# Patient Record
Sex: FEMALE | Race: WHITE | NOT HISPANIC OR LATINO | Employment: UNEMPLOYED | URBAN - METROPOLITAN AREA
[De-identification: names, ages, dates, MRNs, and addresses within clinical notes are randomized per-mention and may not be internally consistent; named-entity substitution may affect disease eponyms.]

---

## 2022-01-22 PROCEDURE — U0004 COV-19 TEST NON-CDC HGH THRU: HCPCS | Performed by: EMERGENCY MEDICINE

## 2022-04-19 ENCOUNTER — PATIENT ROUNDING (BHMG ONLY) (OUTPATIENT)
Dept: FAMILY MEDICINE CLINIC | Facility: CLINIC | Age: 32
End: 2022-04-19

## 2022-04-19 ENCOUNTER — OFFICE VISIT (OUTPATIENT)
Dept: FAMILY MEDICINE CLINIC | Facility: CLINIC | Age: 32
End: 2022-04-19

## 2022-04-19 VITALS
HEIGHT: 67 IN | BODY MASS INDEX: 30.42 KG/M2 | WEIGHT: 193.8 LBS | SYSTOLIC BLOOD PRESSURE: 120 MMHG | HEART RATE: 76 BPM | DIASTOLIC BLOOD PRESSURE: 82 MMHG | RESPIRATION RATE: 18 BRPM | OXYGEN SATURATION: 98 %

## 2022-04-19 DIAGNOSIS — K62.5 RECTAL BLEEDING: Primary | ICD-10-CM

## 2022-04-19 PROCEDURE — 99204 OFFICE O/P NEW MOD 45 MIN: CPT | Performed by: FAMILY MEDICINE

## 2022-04-19 NOTE — PROGRESS NOTES
New Patient Office Visit      Patient Name: Olena Casanova  : 1990   MRN: [id number]     Chief Complaint:    Chief Complaint   Patient presents with   • Establish Care   • Rectal Bleeding       Subjective   Rectal Bleeding  This is a recurrent problem. Episode onset: 3 years. The problem occurs intermittently. The problem has been waxing and waning. Associated symptoms include abdominal pain. Pertinent negatives include no change in bowel habit, fatigue, fever or joint swelling. Nothing aggravates the symptoms. Treatments tried: dietary changes. The treatment provided no relief.     History of Present Illness: Olena Casanova is a 32 y.o. female who is here today to establish care.    Previous primary care: [place], Dr. Luther Rivas    Chronic health conditions:  Chronic migraines: Started as a child. No regular treatment.    Other physicians currently involved in patient's care:  Patient Care Team:  Nabeel العلي DO as PCP - General (Family Medicine)    Acute Concerns:  HPI       The patient reports that she has had blood in her stool for 3 years. She states that she went to her primary care physician in [place] and was told that it was probably hemorrhoids. She states that she has cut out a lot of different things and nothing seems to help. She states that there are times where it is really bad. She states that it is sometimes in the toilet and sometimes a lot. She states that sometimes there is clotting, but not every time. She states that it comes in spurts. She states that there is not a specific time that it will be there for a week or so and then it will go away for a while and then it will come back, which is why they thought it was hemorrhoids. She states that her grandfather passed away from colon cancer at the age of 65. She states that she does not feel like it is getting worse. She states that it did not get worse when she was pregnant. She states that it is about the same. She states  that the blood is usually bright red. She denies any diarrhea, constipation, or changes in her bowel habits. She states that she has a lot of indigestion in her upper abdomen. She states that she gets bloated easily. She states that she is not sure if it is worse when she has bleeding. She states that she did not pay attention to it.     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 She states that she gets migraines a couple of times a month that last for 1 to 2 days. She states that she has been treated for those. She states that they started when she was really young. She states that sometimes she takes Tylenol, but sometimes nothing works. She states that she had CT scans and all sorts of things, and they could never find why. She states that they have been putting her out for days since she was 10 years old. She states that she had a lot of joint stiffness when she was pregnant, but nothing since. She states that she tried to track with food, but there is nothing consistent. She states that they thought it was dairy for a while, but then it just did not pan out over a long period of time.  The patient states she has tried gluten free diets, dairy free and sugar free.     She states that she had blood work done approximately 1 month ago for a thyroid issue for postpartum thyroiditis. She states that her TSH was 0.1 and now she is hypothyroid. She states that she had other blood work done at her 6-week postpartum appointment. She states that her blood counts came back to normal after birth.    This patient is accompanied by their self who contributes to the history of their care.      This patient is accompanied by their self who contributes to the history of their care.    The following portions of the patient's history were reviewed and updated as appropriate: allergies, current medications, past family history, past medical history, past social history, past surgical history and problem  list.    Subjective      Review of Systems:   Review of Systems   Constitutional: Negative for fatigue and fever.   Gastrointestinal: Positive for abdominal pain and hematochezia. Negative for change in bowel habit.   Musculoskeletal: Negative for joint swelling.    - See HPI and new patient paperwork scanned into chart    Past Medical History:   Past Medical History:   Diagnosis Date   • Anemia 9/2021    Geatstional   • Headache     Every few weeks. Last 1-2 days.   • Hyperthyroidism 2/2022    Postpartum   • Hypothyroidism 3/2022    After a month of hyperthyroid meds   • Ovarian cyst    • Thyroid disease during pregnancy        Past Surgical History: History reviewed. No pertinent surgical history.    Family History:   Family History   Problem Relation Age of Onset   • Alcohol abuse Mother    • Cancer Maternal Grandfather         Colon cancer   • Diabetes Maternal Grandmother    • Heart disease Maternal Grandmother    • Kidney disease Maternal Grandmother    • Thyroid disease Sister    • Thyroid disease Paternal Aunt        Social History:   Social History     Socioeconomic History   • Marital status:    Tobacco Use   • Smoking status: Never Smoker   • Smokeless tobacco: Never Used   Vaping Use   • Vaping Use: Never used   Substance and Sexual Activity   • Alcohol use: Yes     Comment: Maybe a glass  once every few months   • Drug use: Never   • Sexual activity: Yes     Partners: Male     Birth control/protection: None       Tobacco History:   Social History     Tobacco Use   Smoking Status Never Smoker   Smokeless Tobacco Never Used       Medications:     Current Outpatient Medications:   •  PRENATAL W/O A VIT-FE FUM-FA PO, Take  by mouth., Disp: , Rfl:   •  FeroSul 325 (65 Fe) MG tablet, Take 1 tablet by mouth Daily With Breakfast., Disp: , Rfl:     Allergies:   No Known Allergies    Objective   Objective     Physical Exam:  Vital Signs:   Vitals:    04/19/22 1435   BP: 120/82   Pulse: 76   Resp: 18  "  SpO2: 98%   Weight: 87.9 kg (193 lb 12.8 oz)   Height: 170.2 cm (67.01\")     Body mass index is 30.35 kg/m².     Physical Exam  Nursing note reviewed  Const: NAD, A&Ox4, Pleasant, Cooperative  Eyes: EOMI, no conjunctivitis  ENT: No nasal discharge present, neck supple  Cardiac: Regular rate and rhythm, no cyanosis  Resp: Respiratory rate within normal limits, no increased work of breathing, no audible wheezing or retractions noted  GI: No distention or ascites  MSK: Motor and sensation grossly intact in bilateral upper extremities  Neurologic: CN II-XII grossly intact  Psych: Appropriate mood and behavior.  Skin: Warm, dry  Procedures/Radiology     Procedures  No radiology results for the last 7 days     Assessment/Plan   Assessment / Plan      Assessment/Plan:   Problems Addressed This Visit  Diagnoses and all orders for this visit:    1. Rectal bleeding (Primary)  -     Ambulatory Referral For Screening Colonoscopy      Problem List Items Addressed This Visit    None     Visit Diagnoses     Rectal bleeding    -  Primary    Relevant Orders    Ambulatory Referral For Screening Colonoscopy        1. Rectal bleeding, chronic, intermittent.  - She has had issues for 3 years at this point. I would recommend a colonoscopy, at least flexible sigmoidoscopy to assess, and further treatment plan pending those results.    2. Migraine.  - I will give her a sample of Nurtec ODT as needed for her migraines, to see if it is effective for her. I told her to call and I will send in a full prescription.    3. Postpartum thyroiditis.  - Continue care with  midwifery.    We will plan to obtain previous records both for chronic preventative care as well as those related to the current episode of care.  Any records that the patient brought with her today were reviewed personally by me during the visit today and will be scanned into the chart for posterity.    Discussed the nature of the disease including relevant anatomy & expected " clinical course, risks, complications, implications, management, safe and proper use of medications. Plan of care reviewed with patient at the conclusion of today's visit. Education was provided regarding diagnosis and management.  Patient verbalizes understanding of and agreement with management plan. Encouraged therapeutic lifestyle changes including low calorie diet with plenty of fruits and vegetables, daily exercise, medication compliance, and keeping scheduled follow up appointments with me and any other providers. Encouraged patient to have appointment for complete physical, fasting labs, appropriate screenings, and immunizations on an annual basis. Discussed extended office hours, shared call, and appropriate use of the ER. Discussed generally we do not prescribe chronic controlled substances from this office. Appropriate referrals will be made to pain management and psychiatry if needed. Stressed the importance and expectation of medical compliance with plan of care, medications, and follow up appointments.    Patient Instructions   A referral has been placed for you today.  You will be contacted by our referral coordinator or the specialist's office to schedule your appointment over the next 2 to 3 business days.  Please make sure to check your voicemail, and ensure that we have the correct phone number on file for you.  Sometimes, based on insurance requirements, referrals may take longer to schedule. However if you have not heard from anyone within 7 days, please call the office to check on your referral status.       Follow Up:   Return in about 1 year (around 4/19/2023) for Annual.        DO BENJIE MontielE [address]ENNETH [address]ACI[address]  [facility]  [address]  [facility] [address]  Fax [phone]  Phone [phone]      Transcribed from ambient dictation for Nabeel العلي DO by Sarah Simpson.  04/19/22   15:55 EDT    Patient verbalized consent  to the visit recording.  I have personally performed the services described in this document as transcribed by the above individual, and it is both accurate and complete.  Sarah Simpson  4/28/2022  15:56 EDT

## 2022-04-19 NOTE — PATIENT INSTRUCTIONS
A referral has been placed for you today.  You will be contacted by our referral coordinator or the specialist's office to schedule your appointment over the next 2 to 3 business days.  Please make sure to check your voicemail, and ensure that we have the correct phone number on file for you.  Sometimes, based on insurance requirements, referrals may take longer to schedule. However if you have not heard from anyone within 7 days, please call the office to check on your referral status.

## 2022-05-05 RX ORDER — SODIUM, POTASSIUM,MAG SULFATES 17.5-3.13G
SOLUTION, RECONSTITUTED, ORAL ORAL
Qty: 354 ML | Refills: 0 | Status: SHIPPED | OUTPATIENT
Start: 2022-05-05 | End: 2022-05-24

## 2022-05-18 ENCOUNTER — OUTSIDE FACILITY SERVICE (OUTPATIENT)
Dept: GASTROENTEROLOGY | Facility: CLINIC | Age: 32
End: 2022-05-18

## 2022-05-18 PROCEDURE — 88305 TISSUE EXAM BY PATHOLOGIST: CPT | Performed by: INTERNAL MEDICINE

## 2022-05-18 PROCEDURE — 45385 COLONOSCOPY W/LESION REMOVAL: CPT | Performed by: INTERNAL MEDICINE

## 2022-05-18 PROCEDURE — 88341 IMHCHEM/IMCYTCHM EA ADD ANTB: CPT | Performed by: INTERNAL MEDICINE

## 2022-05-18 PROCEDURE — 88342 IMHCHEM/IMCYTCHM 1ST ANTB: CPT | Performed by: INTERNAL MEDICINE

## 2022-05-19 ENCOUNTER — LAB REQUISITION (OUTPATIENT)
Dept: LAB | Facility: HOSPITAL | Age: 32
End: 2022-05-19

## 2022-05-19 DIAGNOSIS — K62.5 HEMORRHAGE OF ANUS AND RECTUM: ICD-10-CM

## 2022-05-19 DIAGNOSIS — D12.8 BENIGN NEOPLASM OF RECTUM: ICD-10-CM

## 2022-05-24 ENCOUNTER — OFFICE VISIT (OUTPATIENT)
Dept: GASTROENTEROLOGY | Facility: CLINIC | Age: 32
End: 2022-05-24

## 2022-05-24 VITALS
HEART RATE: 69 BPM | WEIGHT: 194 LBS | BODY MASS INDEX: 30.45 KG/M2 | SYSTOLIC BLOOD PRESSURE: 116 MMHG | DIASTOLIC BLOOD PRESSURE: 80 MMHG | HEIGHT: 67 IN

## 2022-05-24 DIAGNOSIS — C20 RECTAL CANCER: Primary | ICD-10-CM

## 2022-05-24 PROCEDURE — 99214 OFFICE O/P EST MOD 30 MIN: CPT | Performed by: INTERNAL MEDICINE

## 2022-05-24 NOTE — PROGRESS NOTES
PCP:  Nabeel العلي DO     No referring provider defined for this encounter.    Chief Complaint   Patient presents with   • Follow-up     Follow up colonoscopy        HPI   The patient is a 32-year-old known to me from a recent colonoscopy performed 5/18/2022.  At that time she was having some rectal bleeding.  She thought she may have passed a piece of tissue.  She had a colon anoscopy and had a pedunculated approximately 12 mm polyp in the rectum.  This was removed with a snare.  The polyp seem to be completely removed.  Cautery was used.  Unfortunately the pathology returned invasive adenocarcinoma involving a tubular adenoma and the margin was indeterminant.  She is here to discuss options.    No Known Allergies       Current Outpatient Medications:   •  PRENATAL W/O A VIT-FE FUM-FA PO, Take  by mouth., Disp: , Rfl:      Past Medical History:   Diagnosis Date   • Anemia 9/2021    Geatstional   • Headache     Every few weeks. Last 1-2 days.   • Hyperthyroidism 2/2022    Postpartum   • Hypothyroidism 3/2022    After a month of hyperthyroid meds   • Ovarian cyst    • Thyroid disease during pregnancy        History reviewed. No pertinent surgical history.     Social History     Socioeconomic History   • Marital status:    Tobacco Use   • Smoking status: Never Smoker   • Smokeless tobacco: Never Used   Vaping Use   • Vaping Use: Never used   Substance and Sexual Activity   • Alcohol use: Yes     Comment: Maybe a glass  once every few months   • Drug use: Never   • Sexual activity: Yes     Partners: Male     Birth control/protection: None        Family History   Problem Relation Age of Onset   • Alcohol abuse Mother    • Cancer Maternal Grandfather         Colon cancer   • Diabetes Maternal Grandmother    • Heart disease Maternal Grandmother    • Kidney disease Maternal Grandmother    • Thyroid disease Sister    • Thyroid disease Paternal Aunt         Review of Systems     Vitals:    05/24/22 1358   BP:  116/80   Pulse: 69        Physical Exam   General Appearance: Alert, in no acute distress   Head: Normocephalic, without obvious abnormality, atraumatic   Eyes: Lids and lashes normal, conjunctivae and sclerae normal, no icterus, no pallor, corneas clear, PERRLA   Ears: Ears appear intact with no abnormalities noted   Extremities: Moves all extremities well, no edema, no cyanosis, no redness   Skin: No bleeding, bruising or rash   Neurologic: Cranial nerves 2 - 12 grossly intact, no focal deficits         Diagnoses and all orders for this visit:    1. Rectal cancer (HCC) (Primary)  -     CT Chest With Contrast Diagnostic; Future  -     CT Abdomen Pelvis With Contrast; Future  -     CEA; Future  -     CBC & Differential; Future  -     Ambulatory Referral to Oncology Nurse Navigator  -     Comprehensive Metabolic Panel; Future    Impressions and plan #1 invasive adenocarcinoma found within a tubular adenoma removed from the rectum: My recollection is that this may have been about at 10 cm or so.  Her best course of action is to have colorectal surgery tattoo that area within the next week or so.  The polyp site should be visible.  They can do measurements at that time as well.  They can look at the site.  They can discuss options with her at that point.  I did talk to her Dr. Mejias after her visit.  He will try and get her worked in this Friday.  He will identify the site and tattoo it if possible.  I will get a CT of the chest abdomen and pelvis as well as a CEA and a CBC.  I will check a CMP as well.  I have alerted the nurse navigator and we have discussed the patient as well.  She is going to facilitate getting the CAT scan done quickly.  Hopefully the cancer has been removed endoscopically but we discussed at length the fact that a fraction of people will have some spread to her lymph node so additional testing is needed.  They seem to have a good understanding.    Taras Allen MD

## 2022-05-25 ENCOUNTER — LAB (OUTPATIENT)
Dept: LAB | Facility: HOSPITAL | Age: 32
End: 2022-05-25

## 2022-05-25 ENCOUNTER — NURSE NAVIGATOR (OUTPATIENT)
Dept: ONCOLOGY | Facility: CLINIC | Age: 32
End: 2022-05-25

## 2022-05-25 ENCOUNTER — HOSPITAL ENCOUNTER (OUTPATIENT)
Dept: CT IMAGING | Facility: HOSPITAL | Age: 32
Discharge: HOME | End: 2022-05-25

## 2022-05-25 DIAGNOSIS — C20 RECTAL CANCER: ICD-10-CM

## 2022-05-25 LAB
BASOPHILS # BLD AUTO: 0.03 10*3/MM3 (ref 0–0.2)
BASOPHILS NFR BLD AUTO: 0.5 % (ref 0–1.5)
DEPRECATED RDW RBC AUTO: 46.5 FL (ref 37–54)
EOSINOPHIL # BLD AUTO: 0.22 10*3/MM3 (ref 0–0.4)
EOSINOPHIL NFR BLD AUTO: 3.7 % (ref 0.3–6.2)
ERYTHROCYTE [DISTWIDTH] IN BLOOD BY AUTOMATED COUNT: 13.7 % (ref 12.3–15.4)
HCT VFR BLD AUTO: 41.5 % (ref 34–46.6)
HGB BLD-MCNC: 13.9 G/DL (ref 12–15.9)
IMM GRANULOCYTES # BLD AUTO: 0.02 10*3/MM3 (ref 0–0.05)
IMM GRANULOCYTES NFR BLD AUTO: 0.3 % (ref 0–0.5)
LYMPHOCYTES # BLD AUTO: 1.76 10*3/MM3 (ref 0.7–3.1)
LYMPHOCYTES NFR BLD AUTO: 29.8 % (ref 19.6–45.3)
MCH RBC QN AUTO: 30.5 PG (ref 26.6–33)
MCHC RBC AUTO-ENTMCNC: 33.5 G/DL (ref 31.5–35.7)
MCV RBC AUTO: 91 FL (ref 79–97)
MONOCYTES # BLD AUTO: 0.5 10*3/MM3 (ref 0.1–0.9)
MONOCYTES NFR BLD AUTO: 8.5 % (ref 5–12)
NEUTROPHILS NFR BLD AUTO: 3.38 10*3/MM3 (ref 1.7–7)
NEUTROPHILS NFR BLD AUTO: 57.2 % (ref 42.7–76)
NRBC BLD AUTO-RTO: 0 /100 WBC (ref 0–0.2)
PLATELET # BLD AUTO: 216 10*3/MM3 (ref 140–450)
PMV BLD AUTO: 9.8 FL (ref 6–12)
RBC # BLD AUTO: 4.56 10*6/MM3 (ref 3.77–5.28)
WBC NRBC COR # BLD: 5.91 10*3/MM3 (ref 3.4–10.8)

## 2022-05-25 PROCEDURE — 25010000002 IOPAMIDOL 61 % SOLUTION: Performed by: INTERNAL MEDICINE

## 2022-05-25 PROCEDURE — 71260 CT THORAX DX C+: CPT

## 2022-05-25 PROCEDURE — 82378 CARCINOEMBRYONIC ANTIGEN: CPT

## 2022-05-25 PROCEDURE — 74177 CT ABD & PELVIS W/CONTRAST: CPT

## 2022-05-25 PROCEDURE — 36415 COLL VENOUS BLD VENIPUNCTURE: CPT

## 2022-05-25 PROCEDURE — 80053 COMPREHEN METABOLIC PANEL: CPT

## 2022-05-25 PROCEDURE — 85025 COMPLETE CBC W/AUTO DIFF WBC: CPT

## 2022-05-25 RX ADMIN — IOPAMIDOL 90 ML: 612 INJECTION, SOLUTION INTRAVENOUS at 17:16

## 2022-05-25 NOTE — PROGRESS NOTES
Dr. Allen called and asked me to navigate patient who has recently been diagnosed with Rectal Cancer at the age of 32 who is  and has a 5 month old daughter. I called [facility] and they authorized and scheduled patient to have scans (CT Chest/abd/pelvis) today with an arrival time of 2:45 at the [facility] for scans at 4 PM. Before I could call the patient, she had already called me.  I went over the instructions for patient to get to [facility] at 2:45 PM today for her 4 PM scan. I notified patient that she should not eat or drink from the time I talked with her. I quickly went over the process of staging her cancer. Patient is very anxious on her new diagnosis. I will follow up with patient tomorrow and go over the resources in the Cancer Center. I feel like the patient would benefit from being referred to Toña Sapp to talk through a plan of getting through this new diagnosis of cancer.  I also let patient know that she has labs that she will need to get done while at the [facility] location. Patient does have a maternal grandfather that was diagnosed with Colon Cancer.

## 2022-05-26 ENCOUNTER — NURSE NAVIGATOR (OUTPATIENT)
Dept: ONCOLOGY | Facility: CLINIC | Age: 32
End: 2022-05-26

## 2022-05-26 LAB
ALBUMIN SERPL-MCNC: 4.9 G/DL (ref 3.5–5.2)
ALBUMIN/GLOB SERPL: 1.6 G/DL
ALP SERPL-CCNC: 56 U/L (ref 39–117)
ALT SERPL W P-5'-P-CCNC: 9 U/L (ref 1–33)
ANION GAP SERPL CALCULATED.3IONS-SCNC: 8.4 MMOL/L (ref 5–15)
AST SERPL-CCNC: 14 U/L (ref 1–32)
BILIRUB SERPL-MCNC: 0.6 MG/DL (ref 0–1.2)
BUN SERPL-MCNC: 15 MG/DL (ref 6–20)
BUN/CREAT SERPL: 16.3 (ref 7–25)
CALCIUM SPEC-SCNC: 9.9 MG/DL (ref 8.6–10.5)
CEA SERPL-MCNC: 1 NG/ML
CHLORIDE SERPL-SCNC: 105 MMOL/L (ref 98–107)
CO2 SERPL-SCNC: 25.6 MMOL/L (ref 22–29)
CREAT SERPL-MCNC: 0.92 MG/DL (ref 0.57–1)
EGFRCR SERPLBLD CKD-EPI 2021: 86.1 ML/MIN/1.73
GLOBULIN UR ELPH-MCNC: 3 GM/DL
GLUCOSE SERPL-MCNC: 76 MG/DL (ref 65–99)
POTASSIUM SERPL-SCNC: 4.1 MMOL/L (ref 3.5–5.2)
PROT SERPL-MCNC: 7.9 G/DL (ref 6–8.5)
SODIUM SERPL-SCNC: 139 MMOL/L (ref 136–145)

## 2022-05-26 NOTE — PROGRESS NOTES
Emailed Dr. Mejias regarding patient. MD requested patient's medical records. I spoke with La in the Med Onc phone room and she faxed that information over to [facility]. MD knows that patient had CT scans of Chest/Abdomen/Pelvis and has had her CEA drawn. He is aware that patient has not had her MRI pelvis with rectal protocol and has not been referred to Med Onc/Rad Onc.  MD will order that tomorrow after seeing patient. Alida at [facility] will let me know after she has an authorization for MRI and I will try to expedite it and schedule with Oncology. AG

## 2022-05-27 ENCOUNTER — NURSE NAVIGATOR (OUTPATIENT)
Dept: ONCOLOGY | Facility: CLINIC | Age: 32
End: 2022-05-27

## 2022-05-27 ENCOUNTER — TRANSCRIBE ORDERS (OUTPATIENT)
Dept: ADMINISTRATIVE | Facility: HOSPITAL | Age: 32
End: 2022-05-27

## 2022-05-27 DIAGNOSIS — C20 MALIGNANT NEOPLASM OF RECTUM: Primary | ICD-10-CM

## 2022-05-27 NOTE — PROGRESS NOTES
I called patient to let her know that I scheduled her appointment for her MRI for 6/2 with an arrival time of 7:45 AM in Registration, parking in the [address] .  I also let patient know that she could not eat/drink for 4 hours prior to the MRI.  I talked with Alida at [facility] to notify her of patient's scheduled MRI and MED/RAD appointments.      After making patient's referral appointments with Medical and Radiation Oncology, I called patient but got her voicemail where I left her a message to call me back for her appointments.

## 2022-05-27 NOTE — PROGRESS NOTES
Patient called me back and I was able to give her her Med and Rad Onc appointments along with directions to each MD. Patient verbalized understanding. AG

## 2022-06-01 ENCOUNTER — TRANSCRIBE ORDERS (OUTPATIENT)
Dept: ADMINISTRATIVE | Facility: HOSPITAL | Age: 32
End: 2022-06-01

## 2022-06-01 DIAGNOSIS — C20 MALIGNANT NEOPLASM OF RECTUM: Primary | ICD-10-CM

## 2022-06-02 ENCOUNTER — APPOINTMENT (OUTPATIENT)
Dept: MRI IMAGING | Facility: HOSPITAL | Age: 32
End: 2022-06-02

## 2022-06-02 ENCOUNTER — HOSPITAL ENCOUNTER (OUTPATIENT)
Dept: MRI IMAGING | Facility: HOSPITAL | Age: 32
Discharge: HOME | End: 2022-06-02
Admitting: COLON & RECTAL SURGERY

## 2022-06-02 DIAGNOSIS — C20 MALIGNANT NEOPLASM OF RECTUM: ICD-10-CM

## 2022-06-02 PROCEDURE — 72195 MRI PELVIS W/O DYE: CPT

## 2022-06-03 ENCOUNTER — OFFICE VISIT (OUTPATIENT)
Dept: GYNECOLOGIC ONCOLOGY | Facility: CLINIC | Age: 32
End: 2022-06-03

## 2022-06-03 ENCOUNTER — NURSE NAVIGATOR (OUTPATIENT)
Dept: ONCOLOGY | Facility: CLINIC | Age: 32
End: 2022-06-03

## 2022-06-03 ENCOUNTER — OFFICE VISIT (OUTPATIENT)
Dept: RADIATION ONCOLOGY | Facility: HOSPITAL | Age: 32
End: 2022-06-03

## 2022-06-03 ENCOUNTER — HOSPITAL ENCOUNTER (OUTPATIENT)
Dept: RADIATION ONCOLOGY | Facility: HOSPITAL | Age: 32
Setting detail: RADIATION/ONCOLOGY SERIES
Discharge: HOME | End: 2022-06-03

## 2022-06-03 ENCOUNTER — CONSULT (OUTPATIENT)
Dept: ONCOLOGY | Facility: CLINIC | Age: 32
End: 2022-06-03

## 2022-06-03 VITALS
HEART RATE: 67 BPM | BODY MASS INDEX: 30.67 KG/M2 | DIASTOLIC BLOOD PRESSURE: 76 MMHG | RESPIRATION RATE: 18 BRPM | WEIGHT: 195.4 LBS | SYSTOLIC BLOOD PRESSURE: 102 MMHG | OXYGEN SATURATION: 97 % | HEIGHT: 67 IN | TEMPERATURE: 97.3 F

## 2022-06-03 VITALS
HEART RATE: 82 BPM | DIASTOLIC BLOOD PRESSURE: 74 MMHG | BODY MASS INDEX: 30.7 KG/M2 | RESPIRATION RATE: 14 BRPM | TEMPERATURE: 97.3 F | WEIGHT: 196 LBS | SYSTOLIC BLOOD PRESSURE: 109 MMHG

## 2022-06-03 VITALS
DIASTOLIC BLOOD PRESSURE: 74 MMHG | BODY MASS INDEX: 30.76 KG/M2 | SYSTOLIC BLOOD PRESSURE: 109 MMHG | TEMPERATURE: 97.3 F | HEART RATE: 82 BPM | RESPIRATION RATE: 14 BRPM | WEIGHT: 196 LBS | OXYGEN SATURATION: 98 % | HEIGHT: 67 IN

## 2022-06-03 DIAGNOSIS — C20 RECTAL CANCER: Primary | ICD-10-CM

## 2022-06-03 DIAGNOSIS — C20 RECTAL ADENOCARCINOMA: Primary | ICD-10-CM

## 2022-06-03 DIAGNOSIS — N97.9 FEMALE FERTILITY PROBLEM: ICD-10-CM

## 2022-06-03 DIAGNOSIS — T66.XXXA ADVERSE EFFECT OF RADIATION, INITIAL ENCOUNTER: ICD-10-CM

## 2022-06-03 PROCEDURE — 99204 OFFICE O/P NEW MOD 45 MIN: CPT | Performed by: INTERNAL MEDICINE

## 2022-06-03 PROCEDURE — G0463 HOSPITAL OUTPT CLINIC VISIT: HCPCS | Performed by: RADIOLOGY

## 2022-06-03 PROCEDURE — 99204 OFFICE O/P NEW MOD 45 MIN: CPT | Performed by: OBSTETRICS & GYNECOLOGY

## 2022-06-03 NOTE — PROGRESS NOTES
I met patient downstairs where patient is filling out paperwork to see Dr. Cee. I met her mother and her , along with her sweet baby girl. I talked with Dr. Tejada and he was fine with the baby coming along during the consult. AG

## 2022-06-03 NOTE — PROGRESS NOTES
Olena Casanova  [id number]  1990      Reason for visit: Newly diagnosed stage IIA rectal cancer; consultation regarding possible oophoropexy for ovarian preservation    Consultation:  Patient is being seen at the request of Dr. Cee    History of present illness:  The patient is a 32 y.o. year old female who presents today for treatment and evaluation of the above issues.    Patient was recently diagnosed with invasive rectal adenocarcinoma after having a colonoscopy 2022 performed for 3-year history of hematochezia. She is a patient of Dr. Cee's and plans to start radiation (as well as chemo) within the next month. She is here for more information regarding ovarian preservation.  See below OB/GYN history for more detail.    Denies any GYN concerns at this time. Denies any family history of GYN malignancy.    For new patients, [facility] intake form from 6/3/2022 was reviewed and confirmed.    OBGYN History:  She is a . 1x prior  2021 at . She gets her primary GYN care through  Midwives.  She does not use HRT. She does not have a history of abnormal pap smears. Has history of ovarian cysts. Menarche age 18; history of irregular cycles due to PCOS. History of infertility, though was able to conceive with use of Femara.      Oncologic History:  Oncology/Hematology History   Rectal cancer (HCC)   2022 Cancer Staged    Staging form: Colon And Rectum, AJCC 8th Edition  - Clinical stage from 2022: Stage IIA (cT3, cN0, cM0) - Signed by Joey Cee MD on 6/3/2022     6/3/2022 Initial Diagnosis    Rectal cancer (HCC)           Past Medical History:   Diagnosis Date   • Anemia 2021    Geatstional   • Headache     Every few weeks. Last 1-2 days.   • Hyperthyroidism 2022    Postpartum   • Hypothyroidism 3/2022    After a month of hyperthyroid meds   • Ovarian cyst    • Rectal cancer (HCC)    • Thyroid disease during pregnancy        Past Surgical History:   Procedure  Laterality Date   • COLONOSCOPY         MEDICATIONS: The current medication list was reviewed with the patient and updated in the EMR this date per the Medical Assistant. Medication dosages and frequencies were confirmed to be accurate.      Allergies:  has No Known Allergies.    Social History:   Social History     Socioeconomic History   • Marital status:    Tobacco Use   • Smoking status: Never Smoker   • Smokeless tobacco: Never Used   Vaping Use   • Vaping Use: Never used   Substance and Sexual Activity   • Alcohol use: Yes     Comment: Maybe a glass  once every few months   • Drug use: Never   • Sexual activity: Defer       Family History:    Family History   Problem Relation Age of Onset   • Alcohol abuse Mother    • Cancer Maternal Grandfather         Colon cancer   • Diabetes Maternal Grandmother    • Heart disease Maternal Grandmother    • Kidney disease Maternal Grandmother    • Thyroid disease Sister    • Thyroid disease Paternal Aunt        Health Maintenance:    Health Maintenance   Topic Date Due   • ANNUAL PHYSICAL  Never done   • COVID-19 Vaccine (1) Never done   • PAP SMEAR  Never done   • INFLUENZA VACCINE  08/01/2022   • TDAP/TD VACCINES (2 - Td or Tdap) 09/08/2031   • HEPATITIS C SCREENING  Completed   • Pneumococcal Vaccine 0-64  Aged Out       Review of Systems   Please refer to HPI, review of systems otherwise negative    Physical Exam    Vitals:    06/03/22 1122   BP: 109/74   Pulse: 82   Resp: 14   Temp: 97.3 °F (36.3 °C)   TempSrc: Temporal   Weight: 88.9 kg (196 lb)   PainSc: 0-No pain       Body mass index is 30.7 kg/m².    Wt Readings from Last 3 Encounters:   06/03/22 88.9 kg (196 lb)   06/03/22 88.9 kg (196 lb)   06/03/22 88.6 kg (195 lb 6.4 oz)     GENERAL: Alert, well-appearing female appearing her stated age who is in no apparent distress.   HEENT: Sclera anicteric. Head normocephalic, atraumatic. Mucus membranes moist.   NECK: Trachea midline, supple, without  masses.  BREASTS: Deferred  CARDIOVASCULAR: Normal rate, regular rhythm. No peripheral edema.  RESPIRATORY: Clear to auscultation bilaterally, normal respiratory effort  SKIN:  Warm, dry, well-perfused.  All visible areas intact.  No rashes, lesions, ulcers.  PSYCHIATRIC: AO x3, with appropriate affect, normal thought processes.  NEUROLOGIC: No focal deficits.  Moves extremities well.  MUSCULOSKELETAL: Normal gait and station.   EXTREMITIES:   No cyanosis, clubbing, symmetric.     PELVIC exam:  Deferred    ECOG PS 0    PROCEDURES: n/a    Diagnostic Data:     CT Chest With Contrast Diagnostic, 5/25/2022  No acute findings or evidence of metastatic disease in the chest, abdomen and pelvis. If there is persistent concern for occult metastatic disease, PET/CT would be more sensitive for initial staging evaluation.  This report was finalized on 5/25/2022 5:48 PM by Misha Yoder.      MRI Pelvis Without Contrast, 6/2/2022  MRI rectal cancer T category is: T2/Early T3    Maximum EMD of invasion is: Up to 1.5 mm. Desmoplastic reaction is present and difficult to assess for true tumor extension beyond the muscularis propria at the 7:00-8:00 position.   Minimum tumor to MRF distance is: 3 mm if true extramural invasion    Low rectal tumor component: No   Mesorectal nodes/tumor deposits: None suspicious. Largest lymph node measures 5 mm with oval shape.   EMVI: None   Extramesorectal nodes: None   Electronically Signed: David Betts MD 6/2/2022 22:47 EDT    CT Abdomen Pelvis With Contrast, 5/25/2022  No acute findings or evidence of metastatic disease in the chest, abdomen and pelvis. If there is persistent concern for occult metastatic disease, PET/CT would be more sensitive for initial staging evaluation.  This report was finalized on 5/25/2022 5:48 PM by Misha Yoder.        Lab Results   Component Value Date    WBC 5.91 05/25/2022    HGB 13.9 05/25/2022    HCT 41.5 05/25/2022    MCV 91.0 05/25/2022      05/25/2022    NEUTROABS 3.38 05/25/2022    GLUCOSE 76 05/25/2022    BUN 15 05/25/2022    CREATININE 0.92 05/25/2022    EGFRIFNONA >60 11/23/2021    EGFRIFAFRI >60 11/23/2021     05/25/2022    K 4.1 05/25/2022     05/25/2022    CO2 25.6 05/25/2022    CALCIUM 9.9 05/25/2022    ALBUMIN 4.90 05/25/2022    AST 14 05/25/2022    ALT 9 05/25/2022    BILITOT 0.6 05/25/2022     No results found for:         Assessment & Plan   This is a 32 y.o. woman with new diagnosis of rectal cancer with planned upcoming chemoradiation here for consultation regarding oophoropexy for ovarian preservation.  - Discussed options of choosing surgery for oophoropexy via robot-assisted laparoscopy in attempt to elevate ovaries out of radiation field. Would recommend bilateral salpingectomy at that time for opportunistic risk reduction. Also discussed expectations regarding menopausal symptoms and negative health impacts (These included hot flashes, vaginal dryness, change in libido, sleep disturbance, and concentration issues.  Loss of bone density was discussed as was possible risk of decreased longevity) from radiation-induced premature ovarian failure if she were to elect to not undergoing surgery. We discussed options for hormonal replacement including estrogen, progesterone, testosterone. She would like time to discuss these options with her . Offered her surgery date of 6/6/2022, however she declined today and will reach out to clinic once she has made a decision.   - Briefly discussed that she would not be able to carry children after receiving radiation. We also discussed she should expect to no longer have menstrual cycles.   - She already has Dr. Jameson's information should she decide she would like to pursue egg harvesting.  She was advised that this would be possible after oophoropexy, but not if she underwent definitive radiation with the ovaries in situ.  If patient did undergo for a pexy, she would still be at  risk for torsion, ovarian cyst, associated symptoms, and possible subsequent surgery for ovarian issues.  Dr. Vigil personally discussed this case with Dr. Joey Cee.      Patient was consented for examination under anesthesia, robotic assisted bilateral salpingectomy and bilateral oophoropexy.    Risks and benefits of surgery were discussed.  This included, but was not limited to, infection and bleeding like when the skin is cut; damage to surrounding structures; and incisional complications.  Risk of DVT was addressed for major surgeries.  Standard of care efforts to minimize these risks were reviewed.  Typical hospital stay and recovery were discussed as well as post-procedure precautions.  Surgical implications of chronic illnesses on recovery and surgical outcome were reviewed.     Pain medication regimen for postoperative care was discussed.  Typical regimen and avoidance of narcotics was discussed.  Patient was educated that other factors, such as existing narcotic use, can impact postoperative pain management.        Encounter Diagnoses   Name Primary?   • Rectal cancer (HCC) Yes   • Adverse effect of radiation, initial encounter    • Female fertility problem        Pain assessment was performed today as a part of patient’s care.  For patients with pain related to surgery, gynecologic malignancy or cancer treatment, the plan is as noted in the assessment/plan.  For patients with pain not related to these issues, they are to seek any further needed care from a more appropriate provider, such as PCP.      No orders of the defined types were placed in this encounter.    FOLLOW UP: Surgery    I spent 40 minutes caring for Olena on this date of service. This time includes time spent by me in the following activities: preparing for the visit, reviewing tests, performing a medically appropriate examination and/or evaluation, counseling and educating the patient/family/caregiver, ordering medications,  tests, or procedures, referring and communicating with other health care professionals and documenting information in the medical record    Patient was seen and examined with Dr. Goodson,  resident, who performed portions of the examination and documentation for this patient's care under my direct supervision.  I agree with the above documentation and plan.    Lorenza Vigil MD  06/03/22  17:26 EDT

## 2022-06-03 NOTE — PROGRESS NOTES
CONSULTATION NOTE      :                                                          1990  DATE OF CONSULTATION:                       2022   REQUESTING PHYSICIAN:                   Taras Allen MD  REASON FOR CONSULTATION:           Rectal Cancer  Cancer Staging  Stage IIA (cT3, cN0, cM0)    Thank you for requesting my services in evaluation of this pleasant individual.  I am seeing them in outpatient consultation regarding a diagnosis of rectal cancer.     BRIEF HISTORY:  The patient is a very pleasant 32 y.o. female  with no significant past medical history, who recently presented to the emergency department with increasing rectal bleeding.  She subsequently underwent a colonoscopy demonstrating a polyp that was located at the rectosigmoid junction.  A polypectomy was performed with final pathology demonstrating an invasive adenocarcinoma arising in the setting of a tubular adenoma with indeterminate margins.  She was then referred to Dr. Mejias with [facility] who repeated the colonoscopy.  No further excision was recommended, the site was marked, and she has been referred for consideration of chemoradiation.  The patient had an MRI of the pelvis performed yesterday, which appears to show a T2/ likely early T3 tumor but without any evidence of regional davis disease.  From a symptomatic standpoint, she states her bleeding has resolved, but retrospectively, she states that she has actually had bleeding on and off for the past 2 years that was previously attributed to menstrual symptoms.  She denies any other constitutional symptoms.  Upon arriving to clinic today, she and her family were rather anxious as they had not yet been made aware of the results of her MRI.    Allergy: No Known Allergies    Social History:   Social History     Socioeconomic History   • Marital status:    Tobacco Use   • Smoking status: Never Smoker   • Smokeless tobacco: Never Used   Vaping Use   • Vaping Use: Never used  "  Substance and Sexual Activity   • Alcohol use: Yes     Comment: Maybe a glass  once every few months   • Drug use: Never   • Sexual activity: Defer       Past Medical History:   Past Medical History:   Diagnosis Date   • Anemia 9/2021    Geatstional   • Headache     Every few weeks. Last 1-2 days.   • Hyperthyroidism 2/2022    Postpartum   • Hypothyroidism 3/2022    After a month of hyperthyroid meds   • Ovarian cyst    • Rectal cancer (HCC)    • Thyroid disease during pregnancy        Family History: family history includes Alcohol abuse in her mother; Cancer in her maternal grandfather; Diabetes in her maternal grandmother; Heart disease in her maternal grandmother; Kidney disease in her maternal grandmother; Thyroid disease in her paternal aunt and sister.     Surgical History:   Past Surgical History:   Procedure Laterality Date   • COLONOSCOPY          Review of Systems:   Review of Systems   All other systems reviewed and are negative.          Objective   VITAL SIGNS:   Vitals:    06/03/22 0911   BP: 102/76   Pulse: 67   Resp: 18   Temp: 97.3 °F (36.3 °C)   TempSrc: Temporal   SpO2: 97%   Weight: 88.6 kg (195 lb 6.4 oz)   Height: 170.2 cm (67\")   PainSc: 0-No pain        Karnofsky score: 90       Physical Exam:   Physical Exam  Vitals and nursing note reviewed.   Constitutional:       General: She is not in acute distress.     Appearance: She is well-developed.   HENT:      Head: Normocephalic and atraumatic.   Eyes:      Conjunctiva/sclera: Conjunctivae normal.      Pupils: Pupils are equal, round, and reactive to light.   Cardiovascular:      Heart sounds: No murmur heard.    No friction rub.   Pulmonary:      Effort: Pulmonary effort is normal.   Genitourinary:     Comments: TRESA was deferred today  Musculoskeletal:         General: Normal range of motion.      Cervical back: Normal range of motion and neck supple.   Lymphadenopathy:      Cervical: No cervical adenopathy.   Skin:     General: Skin is warm " and dry.   Neurological:      Mental Status: She is alert and oriented to person, place, and time.   Psychiatric:         Behavior: Behavior normal.         Thought Content: Thought content normal.         Judgment: Judgment normal.     IMAGING  I have personally reviewed the relevant imaging studies, as follows:  CT Chest With Contrast Diagnostic    Result Date: 5/25/2022  No acute findings or evidence of metastatic disease in the chest, abdomen and pelvis. If there is persistent concern for occult metastatic disease, PET/CT would be more sensitive for initial staging evaluation.  This report was finalized on 5/25/2022 5:48 PM by Misha Yoder.      MRI Pelvis Without Contrast    Result Date: 6/2/2022  MRI rectal cancer T category is: T2/Early T3    Maximum EMD of invasion is: Up to 1.5 mm. Desmoplastic reaction is present and difficult to assess for true tumor extension beyond the muscularis propria at the 7:00-8:00 position.   Minimum tumor to MRF distance is: 3 mm if true extramural invasion    Low rectal tumor component: No   Mesorectal nodes/tumor deposits: None suspicious. Largest lymph node measures 5 mm with oval shape.   EMVI: None   Extramesorectal nodes: None   Electronically Signed: David Betts MD 6/2/2022 22:47 EDT    CT Abdomen Pelvis With Contrast    Result Date: 5/25/2022  No acute findings or evidence of metastatic disease in the chest, abdomen and pelvis. If there is persistent concern for occult metastatic disease, PET/CT would be more sensitive for initial staging evaluation.  This report was finalized on 5/25/2022 5:48 PM by Misha Yoder.      PATHOLOGY  Colonoscopy with Polypectomy 5/18/2022:  Invasive adenocarcinoma involving tubular adenoma.  Margin indeterminate.  See comment.  MSI BY IMMUNOHISTOCHEMISTSRY:  This is a normal phenotype with a low probability of being a MSI-H related tumor.      MLH1: Retained nuclear staining.   MSH2: Retained nuclear staining.   MSH6: Retained  nuclear staining.  PMS2: Retained nuclear staining.        The following portions of the patient's history were reviewed and updated as appropriate: allergies, current medications, past family history, past medical history, past social history, past surgical history and problem list.    Assessment:   Assessment  Mrs. Casanova is a 32 year old female who presents with a new diagnosis of a T3N0 rectal cancer.  I spent over 60 minutes today with the patient, her , and her mother discussing the results of her MRI, showing the imaging, and also discussing the currently standard of care treatment of neoadjuvant chemoradiation followed by additional chemotherapy in a total neoadjuvant therapy approach followed by evaluation for surgery.  She and her family had a lot of questions today, which I believe I answered to their satisfaction.  One of their prime concerns is related to her future ability for child-bearing given her diagnosis.  Unfortunately, she has a rectal cancer and a retroverted uterus.  It is not going to be possible to administer radiation treatments that will not negative affect her fertility, and more importantly, she is going to develop premature ovarian failure unless we transpose her ovaries.  I told her that I think the best option would be to harvest eggs and to consider a surrogate, because even doses of 45 Gy are likely to cause the uterus to lose the ability to expand for a developing fetus.  I have put in consult requests for Fertility and also with Gyn Oncology for ovarian transposition, which if at least that is performed, we can protect her ovarian function.  Overall, her treatments will consist of 50.4 Gy in 28 fractions using IMRT. She will see Dr. Tejada later this morning to discuss concurrent chemotherapy.  I recommend that we wait a few weeks for her to be evaluated and to undergo ovarian transposition and/or egg harvesting if she elects to have that performed.  The family was also  interested in obtaining a PET/CT which I have ordered.  I will see her back in my clinic after the PET/CT is performed to discuss final treatment recommendations and then to perform a radiation setup and simulation session.    RECOMMENDATIONS:    1. Recommend total neoadjuvant therapy  2. Referral to Genetics given young age at diagnosis  3. Referral to Gynecologic Oncology for consideration of ovarian transposition to protect her bone function  4. Referral to Fertility clinic as she would like to bear more children.  Explained to her that this will likely require harvesting eggs and use of a surrogate and we would not expect the uterus to be able to support a pregnancy following radiation.  5. Obtain PET/CT scan  5. Return to clinic for radiation planning    I spent a total of 75 minutes on todays visit, with more than 60 minutes in direct face to face communication, and the remainder of the time spent in reviewing the relevant history, records, available imaging, and for documentation.    Follow Up:   Return in about 2 weeks (around 6/17/2022) for Office Visit, Radiation Simulation.  Diagnoses and all orders for this visit:    1. Rectal cancer (HCC) (Primary)  -     NM Pet Skull Base To Mid Thigh; Future  -     Ambulatory Referral to Gynecologic Oncology  -     Ambulatory Referral to [facility]    Thank you for allowing me to participate in the care of this individual.    Sincerely,       Joey Cee MD

## 2022-06-03 NOTE — PROGRESS NOTES
New Patient Office Visit      Date: 2022     Patient Name: Olena Casanova  MRN: [id number]  : 1990  Referring Physician: Agustin Mejias    Chief Complaint: Establish care for rectal cancer    History of Present Illness: Olena Casanova is a pleasant 32 y.o. female with no significant past medical history who presents today for evaluation of rectal cancer. The patient is accompanied by their , mother,  child who contributes to the history of their care.  Patient states that she had been having intermittent rectal bleeding over the past 1-2 years.  She eventually had a colonoscopy with Dr. Allen for which a 12 mm polyp was found in the rectum.  This was removed with pathology consistent with invasive adenocarcinoma.  She had CT scans as well as an MRI of her pelvis which was nonconcerning for distant disease but was notable for a likely T3 lesion.  No significant lymphadenopathy was noted on imaging.  She is otherwise feeling well.  Denies any significant abdominal pain or discomfort.  She is concerned about potential fertility issues and toxicity with chemotherapy based treatment.      Oncology History:    Oncology/Hematology History   Rectal cancer (HCC)   2022 Cancer Staged    Staging form: Colon And Rectum, AJCC 8th Edition  - Clinical stage from 2022: Stage IIA (cT3, cN0, cM0) - Signed by Joey Cee MD on 6/3/2022     6/3/2022 Initial Diagnosis    Rectal cancer (HCC)         Subjective      Review of Systems:     Constitutional: Negative for fevers, chills, or weight loss  Eyes: Negative for blurred vision or discharge         Ear/Nose/Throat: Negative for difficulty swallowing, sore throat, LAD                                                       Respiratory: Negative for cough, SOA, wheezing                                                                                        Cardiovascular: Negative for chest pain or palpitations                                                                   Gastrointestinal: Negative for nausea, vomiting or diarrhea                                                                     Genitourinary: Negative for dysuria or hematuria                                                                                           Musculoskeletal: Negative for any joint pains or muscle aches                                                                        Neurologic: Negative for any weakness, headaches, dizziness                                                                         Hematologic: Negative for any easy bleeding or bruising                                                                                   Psychiatric: Negative for anxiety or depression                             Past Medical History:   Past Medical History:   Diagnosis Date   • Anemia 9/2021    Geatstional   • Headache     Every few weeks. Last 1-2 days.   • Hyperthyroidism 2/2022    Postpartum   • Hypothyroidism 3/2022    After a month of hyperthyroid meds   • Ovarian cyst    • Rectal cancer (HCC)    • Thyroid disease during pregnancy        Past Surgical History:   Past Surgical History:   Procedure Laterality Date   • COLONOSCOPY         Family History:   Family History   Problem Relation Age of Onset   • Alcohol abuse Mother    • Cancer Maternal Grandfather         Colon cancer   • Diabetes Maternal Grandmother    • Heart disease Maternal Grandmother    • Kidney disease Maternal Grandmother    • Thyroid disease Sister    • Thyroid disease Paternal Aunt        Social History:   Social History     Socioeconomic History   • Marital status:    Tobacco Use   • Smoking status: Never Smoker   • Smokeless tobacco: Never Used   Vaping Use   • Vaping Use: Never used   Substance and Sexual Activity   • Alcohol use: Yes     Comment: Maybe a glass  once every few months   • Drug use: Never   • Sexual activity: Defer       Medications:     Current Outpatient  "Medications:   •  PRENATAL W/O A VIT-FE FUM-FA PO, Take  by mouth., Disp: , Rfl:     Allergies:   No Known Allergies    Objective     Physical Exam:  Vital Signs:   Vitals:    06/03/22 1034   BP: 109/74   Pulse: 82   Resp: 14   Temp: 97.3 °F (36.3 °C)   TempSrc: Temporal   SpO2: 98%   Weight: 88.9 kg (196 lb)   Height: 170.2 cm (67\")   PainSc: 0-No pain     Pain Score    06/03/22 1034   PainSc: 0-No pain     ECOG Performance Status: 0 - Asymptomatic    Constitutional: NAD, ECOG 0  Eyes: PERRLA, scleral anicteric  ENT: No LAD, no thyromegaly  Respiratory: CTAB, no wheezing, rales, rhonchi  Cardiovascular: RRR, no murmurs, pulses 2+ bilaterally  Abdomen: soft, NT/ND, no HSM  Musculoskeletal: strength 5/5 bilaterally, no c/c/e  Neurologic: A&O x 3, CN II-XII intact grossly  Psych: mood and affect congruent, no SI or HI    Results Review:   Lab on 05/25/2022   Component Date Value Ref Range Status   • CEA 05/25/2022 1.00  ng/mL Final   • Glucose 05/25/2022 76  65 - 99 mg/dL Final   • BUN 05/25/2022 15  6 - 20 mg/dL Final   • Creatinine 05/25/2022 0.92  0.57 - 1.00 mg/dL Final   • Sodium 05/25/2022 139  136 - 145 mmol/L Final   • Potassium 05/25/2022 4.1  3.5 - 5.2 mmol/L Final   • Chloride 05/25/2022 105  98 - 107 mmol/L Final   • CO2 05/25/2022 25.6  22.0 - 29.0 mmol/L Final   • Calcium 05/25/2022 9.9  8.6 - 10.5 mg/dL Final   • Total Protein 05/25/2022 7.9  6.0 - 8.5 g/dL Final   • Albumin 05/25/2022 4.90  3.50 - 5.20 g/dL Final   • ALT (SGPT) 05/25/2022 9  1 - 33 U/L Final   • AST (SGOT) 05/25/2022 14  1 - 32 U/L Final   • Alkaline Phosphatase 05/25/2022 56  39 - 117 U/L Final   • Total Bilirubin 05/25/2022 0.6  0.0 - 1.2 mg/dL Final   • Globulin 05/25/2022 3.0  gm/dL Final   • A/G Ratio 05/25/2022 1.6  g/dL Final   • BUN/Creatinine Ratio 05/25/2022 16.3  7.0 - 25.0 Final   • Anion Gap 05/25/2022 8.4  5.0 - 15.0 mmol/L Final   • eGFR 05/25/2022 86.1  >60.0 mL/min/1.73 Final    National Kidney Foundation and American " Society of Nephrology (ASN) Task Force recommended calculation based on the Chronic Kidney Disease Epidemiology Collaboration (CKD-EPI) equation refit without adjustment for race.   • WBC 05/25/2022 5.91  3.40 - 10.80 10*3/mm3 Final   • RBC 05/25/2022 4.56  3.77 - 5.28 10*6/mm3 Final   • Hemoglobin 05/25/2022 13.9  12.0 - 15.9 g/dL Final   • Hematocrit 05/25/2022 41.5  34.0 - 46.6 % Final   • MCV 05/25/2022 91.0  79.0 - 97.0 fL Final   • MCH 05/25/2022 30.5  26.6 - 33.0 pg Final   • MCHC 05/25/2022 33.5  31.5 - 35.7 g/dL Final   • RDW 05/25/2022 13.7  12.3 - 15.4 % Final   • RDW-SD 05/25/2022 46.5  37.0 - 54.0 fl Final   • MPV 05/25/2022 9.8  6.0 - 12.0 fL Final   • Platelets 05/25/2022 216  140 - 450 10*3/mm3 Final   • Neutrophil % 05/25/2022 57.2  42.7 - 76.0 % Final   • Lymphocyte % 05/25/2022 29.8  19.6 - 45.3 % Final   • Monocyte % 05/25/2022 8.5  5.0 - 12.0 % Final   • Eosinophil % 05/25/2022 3.7  0.3 - 6.2 % Final   • Basophil % 05/25/2022 0.5  0.0 - 1.5 % Final   • Immature Grans % 05/25/2022 0.3  0.0 - 0.5 % Final   • Neutrophils, Absolute 05/25/2022 3.38  1.70 - 7.00 10*3/mm3 Final   • Lymphocytes, Absolute 05/25/2022 1.76  0.70 - 3.10 10*3/mm3 Final   • Monocytes, Absolute 05/25/2022 0.50  0.10 - 0.90 10*3/mm3 Final   • Eosinophils, Absolute 05/25/2022 0.22  0.00 - 0.40 10*3/mm3 Final   • Basophils, Absolute 05/25/2022 0.03  0.00 - 0.20 10*3/mm3 Final   • Immature Grans, Absolute 05/25/2022 0.02  0.00 - 0.05 10*3/mm3 Final   • nRBC 05/25/2022 0.0  0.0 - 0.2 /100 WBC Final       CT Chest With Contrast Diagnostic, CT Abdomen Pelvis With Contrast    Result Date: 5/25/2022  Narrative: DATE OF EXAM: 5/25/2022 4:51 PM  PROCEDURE: CT CHEST W CONTRAST DIAGNOSTIC-, CT ABDOMEN PELVIS W CONTRAST-  INDICATIONS: Newly diagnosed rectal/colon cancer  COMPARISON: No comparisons available.  TECHNIQUE: Routine transaxial slices were obtained through the chest after the intravenous administration of 90 mL of Isovue 300.  Reconstructed coronal and sagittal images were also obtained. Automated exposure control and iterative construction methods were used.  The radiation dose reduction device was turned on for each scan per the ALARA (As Low as Reasonably Achievable) protocol.  FINDINGS: CT chest: No pathologic axillary adenopathy or other worrisome body wall soft tissue finding in the chest. No pleural or pericardial effusion. No distinct pathologic mediastinal or hilar lymphadenopathy. Normal caliber thoracic aorta. Evaluation of the osseous structures demonstrates no evidence of acute fracture or aggressive osseous lesion. Evaluation of the lung fields demonstrates no evidence of acute infectious or inflammatory process. There are no suspicious pulmonary nodules.  CT abdomen pelvis: The body wall soft tissues are unremarkable. There is no evidence of acute fracture or aggressive osseous lesion. The liver, spleen, pancreas and bilateral adrenal glands demonstrate homogeneous enhancement without evidence of suspicious focal lesion. The gallbladder is normal. The kidneys demonstrate symmetric nephrogram and contrast excretion without evidence of hydronephrosis or contour deforming mass. Small and large bowel loops are nondilated. The appendix is normal. There is considerable redundancy of the sigmoid:. There is no distinct focal suspicious bowel wall thickening. The appendix is normal. There is no free fluid or pneumoperitoneum. Physiologic-sized bilateral ovarian cystic findings are present. The pelvic viscera are otherwise unremarkable. Nonaneurysmal abdominal aorta. Small, nonenlarged retroperitoneal lymph nodes are present. No distinct pathologic iliac adenopathy.      Impression: No acute findings or evidence of metastatic disease in the chest, abdomen and pelvis. If there is persistent concern for occult metastatic disease, PET/CT would be more sensitive for initial staging evaluation.  This report was finalized on 5/25/2022 5:48  PM by Misha Yoder.      MRI Pelvis Without Contrast    Result Date: 6/2/2022  Narrative: EXAMINATION: MRI PELVIS WO CONTRAST DATE OF EXAM: 6/2/2022 8:28 EDT INDICATION: c20.  Patient recently underwent polypectomy with invasive adenocarcinoma and indeterminate margins. COMPARISON EXAMS: CT abdomen and pelvis 5/25/2022. TECHNIQUE: Routine multiplanar multisequence magnetic resonance imaging of the pelvis was obtained without IV contrast per rectal cancer staging protocol. Suboptimal oblique imaging due to lack of conspicuity of the primary site secondary to polypectomy. FINDINGS: Patient underwent polypectomy. There is focal wall edema along the posterior aspect of the mid rectum which is believed to correspond to the site of primary tumor. This will be the site described in detail below. Correlation with endoscopic results would  be recommended. 1.) TUMOR LOCATION AND CHARACTERISTICS i) Distance of Inferior margin of tumor from the dentate line: 8 cm   ii) Tumor at or below the puborectalis sling:  Above  iii) Relationship to the anterior peritoneal reflection: At the level of the peritoneal reflection. iv) Craniocaudal length of the tumor: 1.3 cm v) Clock face of tumor: 4 o'clock to 8 o'clock vi) Polypoid/ Annular/ Semi-annular: Semi annular  vii) Mucinous: Mildly increased T2 signal within the tumor which could relate to edema from the polypectomy. No significant T2 hyperintense signal to suggest mucinous features by imaging. 2.) EXTRAMURAL DEPTH OF INVASION AND MR T-CATEGORY i) Extramural depth of invasion (Use 0mm for T1 or T2 tumor): Up to 1.5 mm. This is questioned on image 22 of series 8 and image 15 of series 6 at the 7:00-8:00 position, but these series are obliqued to the lumen at the location of the suspected tumor. There is also localized desmoplastic reaction. Post processing to create a true trans axial series to the lumen was performed but not definitive. ii) T category: T2/early T3         iii)  For low rectal tumors (maximum tumor depth at or below the puborectalis sling): N/A 3. RELATIONSHIP OF THE TUMOR TO MESORECTAL FASCIA (MRF) i) Shortest distance of the definitive tumour border to the MRF is: 3 mm if true extramural invasion (measured on image 22 of series 8). ii) Are there any tumour spiculations closer to the MRF? No iii) Location of Tumor margin to MRF: 7:00-8:00 position   4. EXTRAMURAL VENOUS INVASION i) Extramural Venous Invasion (EMVI): None    5. MESORECTAL LYMPH NODES AND TUMOR DEPOSITS   i) Any suspicious mesorectal lymph nodes/tumor deposits: There are multiple small mesorectal lymph nodes without suspicious features. The largest measures 5 mm best seen on axial image 13 of series 8 on the left. There is no evidence of tumor deposit. 6. EXTRAMESORECTAL LYMPH NODES i) Any suspicious extramesorectal lymph nodes: No.   ii) Is the CAREN node station in the field of view: No   7. OTHER FINDINGS: The uterus is present and retroverted. There is normal thickness of the junctional zone of the uterus without adenomyosis or uterine mass. The ovaries appear symmetric in size with multiple follicles.              Impression: MRI rectal cancer T category is: T2/Early T3    Maximum EMD of invasion is: Up to 1.5 mm. Desmoplastic reaction is present and difficult to assess for true tumor extension beyond the muscularis propria at the 7:00-8:00 position.   Minimum tumor to MRF distance is: 3 mm if true extramural invasion    Low rectal tumor component: No   Mesorectal nodes/tumor deposits: None suspicious. Largest lymph node measures 5 mm with oval shape.   EMVI: None   Extramesorectal nodes: None   Electronically Signed: David Betts MD 6/2/2022 22:47 EDT      Assessment / Plan      Assessment/Plan:   1. Rectal adenocarcinoma (HCC) (Primary)  -Diagnosed on recent colonoscopy  -MRI pelvis consistent with a T3 lesion with no concerning adenopathy  -CT C/A/P without evidence of distant disease  -Discussed  her diagnosis, prognosis, potential treatment plans with patient and her family today  -Discussed that standard of care at this time is total neoadjuvant therapy with chemotherapy/radiation followed by chemotherapy followed by potential surgery vs watch and wait  -Patient very concerned about fertility issues with treatment as well as toxicity  -Patient to see Dr. Mejias within the next week to discuss her surgical options  -Patient has met with Dr. Cee today to discuss radiation  -Discussed her case with Dr. Austin who plans to order a PET/CT to help with simulation  -Have referred to Dr. Jameson for fertility sparing work-up  -Patient and family would like a few days to talk about everything before deciding on treatment.  We will contact patient next week to discuss what she decides to do for treatment  -     Ambulatory Referral to Infertility      Follow Up:   Follow-up in about 2-3 weeks     Placido Tejada MD  Hematology and Oncology     Please note that portions of this note may have been completed with a voice recognition program. Efforts were made to edit the dictations, but occasionally words are mistranscribed.

## 2022-06-06 ENCOUNTER — DOCUMENTATION (OUTPATIENT)
Dept: RADIATION ONCOLOGY | Facility: HOSPITAL | Age: 32
End: 2022-06-06

## 2022-06-09 ENCOUNTER — NURSE NAVIGATOR (OUTPATIENT)
Dept: ONCOLOGY | Facility: CLINIC | Age: 32
End: 2022-06-09

## 2022-06-09 NOTE — PROGRESS NOTES
Patient was presented at [facility] this morning. After talking with Dr. Foy, he asked that I notify patient that he will see patient at 11:30 AM tomorrow for a serious discussion on her treatment plan.  I called patient to let her know about the appointment with Dr. Foy tomorrow. Patient said that her mother was out of town and usually watches the baby. I offered to watch the baby, along with the other navigators in the office. Patient says that baby is having some separation issues right now. She was going to try to find someone to watch the baby and talk this through with her . I asked her to call me back if needed and she verbalized understanding.     Dr. Foy also asked me to notify Dr. Vigil's office regarding holding off on their surgery plans. I notified Dania in their office and she said that she would notify Dr. Vigil.

## 2022-06-10 ENCOUNTER — OFFICE VISIT (OUTPATIENT)
Dept: ONCOLOGY | Facility: CLINIC | Age: 32
End: 2022-06-10

## 2022-06-10 ENCOUNTER — LAB (OUTPATIENT)
Dept: LAB | Facility: HOSPITAL | Age: 32
End: 2022-06-10

## 2022-06-10 ENCOUNTER — NURSE NAVIGATOR (OUTPATIENT)
Dept: ONCOLOGY | Facility: CLINIC | Age: 32
End: 2022-06-10

## 2022-06-10 VITALS
RESPIRATION RATE: 16 BRPM | BODY MASS INDEX: 30.61 KG/M2 | SYSTOLIC BLOOD PRESSURE: 136 MMHG | WEIGHT: 195 LBS | TEMPERATURE: 97.5 F | DIASTOLIC BLOOD PRESSURE: 88 MMHG | HEART RATE: 65 BPM | HEIGHT: 67 IN | OXYGEN SATURATION: 98 %

## 2022-06-10 DIAGNOSIS — C20 RECTAL CANCER: Primary | ICD-10-CM

## 2022-06-10 DIAGNOSIS — Z13.79 GENETIC TESTING: Primary | ICD-10-CM

## 2022-06-10 PROCEDURE — 99215 OFFICE O/P EST HI 40 MIN: CPT | Performed by: INTERNAL MEDICINE

## 2022-06-10 NOTE — PROGRESS NOTES
Met patient and spouse at Dr. Foy consult. Talked with Yari/Christine Coyle regarding scheduling patient with Genetics. Blood draw was completed today. Christine said that they would call patient either later today or on Monday for her virtual/inital Genetic appointment, so I notified patient of this and she verbalized understanding.

## 2022-06-10 NOTE — PROGRESS NOTES
CHIEF COMPLAINT: Rectal cancer    Problem List:  Oncology/Hematology History Overview Note   1.  Rectal cancer within a tubular adenoma found on polypectomy for evaluation of persistent rectal bleeding previously presumed to be hemorrhoids:    Oncology history timeline:  -5/18/2022 colonoscopy Dr. Allen with biopsy 12 mm polyp found in the rectum pedunculated removed with snare.  Unfortunately, polypectomy found invasive adenocarcinoma involving tubular adenoma margin indeterminate MSI stable.  -5/25/2022 CBC, CMP unremarkable with CEA 1.00.  CT chest abdomen pelvisNegative for metastasis.  -6/2/2022 MRI pelvis without contrast shows T2/early T3 thickening with no clear-cut invasion and no suspicious mesorectal or extra mesorectal lymph nodes.  -6/3/2022 consultation with Dr. Tejada where the inclination based on data at this point was for total neoadjuvant therapy based on the above MRI.  Egg harvesting, genetic testing, and total neoadjuvant therapy preparation started  -6/6/2022 consultation with Dr. Cee PET ordered and radiation planned with referral to Dr. Vigil for referral oophoropexy  -6/7/2022 follow-up with Dr. Mejias who called Dr. Foy and indicated concern that what we were seeing on MRI and what he was seeing intraluminally was actually just the thickened bed of the cauterized and tattooed bed of the polyp.  -6/8/2022 multidisciplinary tumor board review of all of the above information.  Dr. Simpson concurred with Dr. Mejias that the T2/T3 call does not clearly defined that this is malignancy versus electrocautery and given concerns she has about total neoadjuvant therapy and the potential downside of going through all of that without knowledge of the exact stage, that she be given the option of potentially going to primary surgery.      -6/10/2022 [facility] medical oncology follow-up: I, Brijesh Foy, saw her for the first time today.  I discussed the conundrum as outlined above from our tumor board  and the general consensus with Dr. Zuniga as well as the prior expressed inclination of Dr. Mejias as well as the patient's potential desire to avoid primary chemoradiation but try primary surgery.  She was inclined towards proceeding with surgery.  There was discussion that, should she go through surgery and find that this is T3 or thicker or node positive, that she would still need adjuvant chemoradiation and adjuvant chemotherapy and all of the above measures for ovarian harvest, ovarian protection perhaps with Lupron, protection of her ovaries by nephropexy before radiation etc. all would come into play yet again.  On the other hand if this ended up being T2N0 and her PET that has already been scheduled for 6/23/2022 is negative, then there is the possibility that she would need no chemotherapy or radiation but just radiographic and serial CEA follow-up.  She understands the pros and cons of both approaches and clearly would prefer an approach which leaves the potential for no chemotherapy or radiation though she knows we cannot guarantee such pending the results and she understands that adjuvant chemoradiation and chemotherapy benefits may be somewhat inferior to the magnitude of similar therapies given and a total neoadjuvant approach.  I will have our nurse navigator get the PET results to me and should there be anything concerning on PET for davis or distant metastasis then we will alter this plan.  I will get her to our genetic counselors for subsequent surgical planning but I am doubtful I would put her through prophylactic colectomy at this young age even if genetically driven.  She does have a maternal grandfather with colon cancer.     Rectal cancer (HCC)   6/2/2022 Cancer Staged    Staging form: Colon And Rectum, AJCC 8th Edition  - Clinical stage from 6/2/2022: Stage IIA (cT3, cN0, cM0) - Signed by Joey Cee MD on 6/3/2022     6/3/2022 Initial Diagnosis    Rectal cancer (HCC)    "      HISTORY OF PRESENT ILLNESS:  The patient is a 32 y.o. female, here for follow up on management of rectal cancer.  No new somatic complaints    Past Medical History:   Diagnosis Date   • Anemia 9/2021    Geatstional   • Headache     Every few weeks. Last 1-2 days.   • Hyperthyroidism 2/2022    Postpartum   • Hypothyroidism 3/2022    After a month of hyperthyroid meds   • Ovarian cyst    • Rectal cancer (HCC)    • Thyroid disease during pregnancy      Past Surgical History:   Procedure Laterality Date   • COLONOSCOPY         No Known Allergies    Family History and Social History reviewed and changed as necessary    REVIEW OF SYSTEM:   No new somatic complaint    PHYSICAL EXAM:  No pallor or jaundice.    Vitals:    06/10/22 1140   BP: 136/88   Pulse: 65   Resp: 16   Temp: 97.5 °F (36.4 °C)   SpO2: 98%   Weight: 88.5 kg (195 lb)   Height: 170.2 cm (67\")     Vitals:    06/10/22 1140   PainSc: 0-No pain          ECOG score: 0           Vitals reviewed.    Lab Results   Component Value Date    HGB 13.9 05/25/2022    HCT 41.5 05/25/2022    MCV 91.0 05/25/2022     05/25/2022    WBC 5.91 05/25/2022    NEUTROABS 3.38 05/25/2022    LYMPHSABS 1.76 05/25/2022    MONOSABS 0.50 05/25/2022    EOSABS 0.22 05/25/2022    BASOSABS 0.03 05/25/2022       Lab Results   Component Value Date    GLUCOSE 76 05/25/2022    BUN 15 05/25/2022    CREATININE 0.92 05/25/2022     05/25/2022    K 4.1 05/25/2022     05/25/2022    CO2 25.6 05/25/2022    CALCIUM 9.9 05/25/2022    PROTEINTOT 7.9 05/25/2022    ALBUMIN 4.90 05/25/2022    BILITOT 0.6 05/25/2022    ALKPHOS 56 05/25/2022    AST 14 05/25/2022    ALT 9 05/25/2022               ASSESSMENT & PLAN:  1.  Rectal cancer within a tubular adenoma found on polypectomy for evaluation of persistent rectal bleeding previously presumed to be hemorrhoids:    Oncology history timeline:  -5/18/2022 colonoscopy Dr. Allen with biopsy 12 mm polyp found in the rectum pedunculated removed " with snare.  Unfortunately, polypectomy found invasive adenocarcinoma involving tubular adenoma margin indeterminate MSI stable.  -5/25/2022 CBC, CMP unremarkable with CEA 1.00.  CT chest abdomen pelvisNegative for metastasis.  -6/2/2022 MRI pelvis without contrast shows T2/early T3 thickening with no clear-cut invasion and no suspicious mesorectal or extra mesorectal lymph nodes.  -6/3/2022 consultation with Dr. Tejada where the inclination based on data at this point was for total neoadjuvant therapy based on the above MRI.  Egg harvesting, genetic testing, and total neoadjuvant therapy preparation started  -6/6/2022 consultation with Dr. Cee PET ordered and radiation planned with referral to Dr. Vigil for referral oophoropexy  -6/7/2022 follow-up with Dr. Mejias who called Dr. Foy and indicated concern that what we were seeing on MRI and what he was seeing intraluminally was actually just the thickened bed of the cauterized and tattooed bed of the polyp.  -6/8/2022 multidisciplinary tumor board review of all of the above information.  Dr. Simpson concurred with Dr. Mejias that the T2/T3 call does not clearly defined that this is malignancy versus electrocautery and given concerns she has about total neoadjuvant therapy and the potential downside of going through all of that without knowledge of the exact stage, that she be given the option of potentially going to primary surgery.      -6/10/2022 [facility] medical oncology follow-up: I, Brijesh Foy, saw her for the first time today.  I discussed the conundrum as outlined above from our tumor board and the general consensus with Dr. Zuniga as well as the prior expressed inclination of Dr. Mejias as well as the patient's potential desire to avoid primary chemoradiation but try primary surgery.  She was inclined towards proceeding with surgery.  There was discussion that, should she go through surgery and find that this is T3 or thicker or node positive, that she would  still need adjuvant chemoradiation and adjuvant chemotherapy and all of the above measures for ovarian harvest, ovarian protection perhaps with Lupron, protection of her ovaries by nephropexy before radiation etc. all would come into play yet again.  On the other hand if this ended up being T2N0 and her PET that has already been scheduled for 6/23/2022 is negative, then there is the possibility that she would need no chemotherapy or radiation but just radiographic and serial CEA follow-up.  She understands the pros and cons of both approaches and clearly would prefer an approach which leaves the potential for no chemotherapy or radiation though she knows we cannot guarantee such pending the results and she understands that adjuvant chemoradiation and chemotherapy benefits may be somewhat inferior to the magnitude of similar therapies given and a total neoadjuvant approach.  I will have our nurse navigator get the PET results to me and should there be anything concerning on PET for davis or distant metastasis then we will alter this plan.  I will get her to our genetic counselors for subsequent surgical planning but I am doubtful I would put her through prophylactic colectomy at this young age even if genetically driven.  She does have a maternal grandfather with colon cancer.    Total time of care today inclusive of time spent today reviewing and recounting our extensive discussions from tumor board yesterday and during visit translating all of that extensive data and modifications of prior plans based on the opinions of our MRI rectal radiologist, the combined opinions of colorectal surgeons including Dr. Mejias and Dr. Zuniga, and during visit translating that information and her own preference to go with a more conservative approach for going neoadjuvant therapy and after visit setting her up with genetic testing and communicating through our nurse navigator to her the need to continue on with the planned PET and  follow-up with Dr. Mejias for surgery and follow-up with me after that took 90 minutes of patient care time throughout the day today.    Brijesh Foy MD    06/10/2022

## 2022-06-10 NOTE — PROGRESS NOTES
Met patient and her spouse at [facility] while she consulted with Dr. Foy. MD was very thorough with the discussion of treatment plans with surgery first. Later Dr. Foy told me that he still wanted patient to get her scheduled PET scan that was ordered per Dr. Cee. I called patient to let her know, but had to leave a voicemail. AG

## 2022-06-13 ENCOUNTER — NURSE NAVIGATOR (OUTPATIENT)
Dept: ONCOLOGY | Facility: CLINIC | Age: 32
End: 2022-06-13

## 2022-06-13 NOTE — PROGRESS NOTES
Patient called me back today and was asking if Dr. Foy wanted the PET before her surgery with Magalie. I talked with Dr. Foy and he does want patient to have PET before surgery for completeness of staging. I left Alida at [facility] a message to let her know that I changed patient's PET from 6/23 to 6/17. I verified with patient that she could do the PET on 6/17 and she can. Patient sees Dr. Mejias at 3:30 PM today. I told patient to check my chart for instructions on what to eat the night before the PET and what to do the day of the PET.

## 2022-06-14 ENCOUNTER — TELEPHONE (OUTPATIENT)
Dept: ONCOLOGY | Facility: HOSPITAL | Age: 32
End: 2022-06-14

## 2022-06-14 ENCOUNTER — NURSE NAVIGATOR (OUTPATIENT)
Dept: ONCOLOGY | Facility: CLINIC | Age: 32
End: 2022-06-14

## 2022-06-14 NOTE — PROGRESS NOTES
Patient left me a message asking to please change her PET back to the 6/23. I notified Duane in PET.      I called patient back today to let her know that her PET is still showing up for 6/23. I went over instructions with her regarding instructions in My Chart regarding prep for PET scan. Patient said that she would look at that. I also encouraged her to call me for any questions regarding the PET prep.     Patient says that Dr. Mejias scheduled her for surgery on

## 2022-06-14 NOTE — PROGRESS NOTES
Patient had left me a message saying that she wanted to keep her PET scan scheduled for the 6/23. I called Duane in PET to let him know. In Epic it is still showing patient is scheduled for her PET on 6/23. We went over instructions of PET prep viewable under my chart. I encouraged pt to call for any PET questions.

## 2022-06-14 NOTE — TELEPHONE ENCOUNTER
I left a message with the patient to contact me regarding questionnaire for an upcoming appointment.

## 2022-06-15 ENCOUNTER — CLINICAL SUPPORT (OUTPATIENT)
Dept: GENETICS | Facility: HOSPITAL | Age: 32
End: 2022-06-15

## 2022-06-15 DIAGNOSIS — Z13.79 GENETIC TESTING: Primary | ICD-10-CM

## 2022-06-15 DIAGNOSIS — C20 RECTAL CANCER: ICD-10-CM

## 2022-06-15 DIAGNOSIS — Z80.0 FAMILY HISTORY OF MALIGNANT NEOPLASM OF GASTROINTESTINAL TRACT: ICD-10-CM

## 2022-06-16 NOTE — PROGRESS NOTES
Olena Casanova is a 32-year-old female who was referred for genetic counseling due to a personal history of rectal cancer. Genetic counseling was completed via telehealth. Ms. Casanova was recently diagnosed with rectal cancer at age 32.  MMR deficiency screening was performed by IHC on the tumor specimen and it was normal. However, IHC screening misses approximately 10% of cases of Gibson syndrome, and does not screen for other genetic risk factors related to colorectal cancer, therefore Ms. Casanova is still a candidate for genetic testing due to her personal and family history. She retains her uterus and ovaries. She was interested in discussing her risk for a hereditary cancer syndrome. Ms. Casanova was interested in pursuing a multi-gene panel, and therefore the CancerNext panel was ordered through [employer] which analyzes 36 genes associated with an increased cancer risk.     FAMILY HISTORY (see attached pedigree):    Mat. Grandfather: Colon cancer, 67  Mother:  Eight colon polyps removed at age 50  Mat. Uncle:  Twelve colon polyps removed at age 41    We do not have medical records confirming the diagnoses in Ms. Casanova’s family.    RISK ASSESSMENT: Ms. Casanova’s personal history of cancer led to concern regarding a hereditary cancer syndrome. Ms. Casanova clearly meets NCCN guidelines criteria for Gibson syndrome testing based on her personal history of rectal cancer diagnosed before age 50. Ms. Casanova opted to pursue multigene panel testing via the CancerNext panel. These risk assessments are based on the family history information provided at the time of the appointment and could change in the future should new information be obtained.    GENETIC COUNSELING:  We reviewed the family history information in detail. Cases of cancer follow three general patterns: sporadic, familial, and hereditary.  While most cancer is sporadic, some cases appear to occur in family clusters.  These cases are said to be familial and  account for 10-20% of cancer cases.  Familial cases may be due to a combination of shared genes and environmental factors among family members.  In even fewer families, the cancer is said to be inherited, and the genes responsible for the cancer are known.      Family histories typical of hereditary cancer syndromes usually include multiple first- and second-degree relatives diagnosed with cancer types that define a syndrome. These cases tend to be diagnosed at younger-than-expected ages and can be bilateral or multifocal. The cancer in these families follows an autosomal dominant inheritance pattern, which indicates the likely presence of a mutation in a cancer susceptibility gene. Children and siblings of an individual believed to carry this mutation have a 50% chance of inheriting that mutation, thereby inheriting the increased risk to develop cancer. These mutations can be passed down from the maternal or the paternal lineage.    The most common form of hereditary colorectal cancer is Gibson syndrome.  Gibson syndrome is caused by mutations in mismatch repair genes, (MLH1, MSH2, MSH6, and PMS2).  The lifetime risk for colon cancer for individuals with Gibson syndrome is approximately 80% if there is no intervention (i.e. removal of polyps detected on colonoscopy).  Other risks include gastric, urinary tract, small intestines, biliary tract, pancreas, and brain.  Women with Gibson syndrome also have an elevated risk for ovarian and endometrial cancer. We also reviewed hereditary polyposis syndromes.       We discussed that there are other hereditary cancer syndromes. Some of these conditions have well defined cancer risks and established management guidelines.  Other genes that can be tested for have been more recently described, and there may be less data regarding the risks and therefore may not have established management guidelines.  We discussed these limitations at length. Based on Ms. Casanova’s personal and  family history of cancer and her desire to get more information regarding her personal risks and risks to family members she opted to pursue testing through a panel evaluating several other genes known to increase the risk for cancer.    GENETIC TESTING:  The risks, benefits and limitations of genetic testing and implications for clinical management following testing were reviewed. DNA test results can influence decisions regarding screening and prevention.  Genetic testing can have significant psychological implications for both individuals and families. Also discussed was the possibility of employment and insurance discrimination based on genetic test results and the federal and states laws that are in place to prevent this (CHERIE), as well as the limitations of these laws.         We discussed panel testing, which would involve testing 36 genes associated with increased cancer risk. The benefits and limitations of genetic testing were discussed. The implications of a positive or negative test result were discussed. We discussed the possibility that, in some cases, genetic test results may be ambiguous due to the identification of a genetic variant. These variants may or may not be associated with an increased cancer risk. With multigene panel testing, it is not uncommon for a variant of uncertain significance (VUS) to be identified.  If a VUS is identified, testing family members is not recommended and screening recommendations are made based on the family history. The laboratories that perform genetic testing work to reclassify the VUS and send out an amended report if and when a VUS is reclassified.  The majority of variant findings are ultimately reclassified to a negative result.     PLAN:  Genetic testing via the CancerNext panel through [employer] was ordered. Ms. Casanova had a blood draw on 6/10/2022 for genetic testing. Results are expected in 2-3 weeks. We will contact Ms. Casanova with her results once  they are received.      Wendy Coyle MS, [facility], [facility]  Licensed Certified Genetic Counselor

## 2022-06-17 LAB
CYTO UR: NORMAL
LAB AP CASE REPORT: NORMAL
LAB AP CLINICAL INFORMATION: NORMAL
LAB AP DIAGNOSIS COMMENT: NORMAL
LAB AP OUTSIDE REPORT, ADDENDUM: NORMAL
PATH REPORT.ADDENDUM SPEC: NORMAL
PATH REPORT.FINAL DX SPEC: NORMAL
PATH REPORT.GROSS SPEC: NORMAL

## 2022-06-21 ENCOUNTER — TELEPHONE (OUTPATIENT)
Dept: GASTROENTEROLOGY | Facility: CLINIC | Age: 32
End: 2022-06-21

## 2022-06-22 ENCOUNTER — DOCUMENTATION (OUTPATIENT)
Dept: GENETICS | Facility: HOSPITAL | Age: 32
End: 2022-06-22

## 2022-06-22 ENCOUNTER — TELEPHONE (OUTPATIENT)
Dept: GENETICS | Facility: HOSPITAL | Age: 32
End: 2022-06-22

## 2022-06-22 NOTE — TELEPHONE ENCOUNTER
Called patient and disclosed negative genetic testing results. Patient asked why she developed cancer, if it wasn't hereditary. I advised her to speak with Dr. Foy and his office in regards to other causes for her cancer specifically. I informed patient we would send results to Dr. Foy and her [employer] account.

## 2022-06-22 NOTE — PROGRESS NOTES
Olena Casanova is a 32-year-old female who was referred for genetic counseling due to a personal history of rectal cancer. Genetic counseling was completed via telehealth. Ms. Casanova was recently diagnosed with rectal cancer at age 32.  MMR deficiency screening was performed by IHC on the tumor specimen and it was normal. However, IHC screening misses approximately 10% of cases of Gibson syndrome, and does not screen for other genetic risk factors related to colorectal cancer, therefore Ms. Casanova is still a candidate for genetic testing due to her personal and family history. She retains her uterus and ovaries. She was interested in discussing her risk for a hereditary cancer syndrome. Ms. Casanova was interested in pursuing a multi-gene panel, and therefore the CancerNext panel was ordered through [employer] which analyzes 36 genes associated with an increased cancer risk. The genes on this panel include APC, TANJA, AXIN2, BARD1, BMPR1A, BRCA1, BRCA2, BRIP1, CDH1, CDK4, CDKN2A, CHEK2, DICER1, EPCAM, GREM1, HOXB13, MLH1, MSH2, MSH3, MSH6, MUTYH, NBN, NF1, NTHL1, PALB2, PMS2, POLD1, POLE, PTEN, RAD51C, RAD51D, RECQL, SMAD4, SMARCA4, STK11, and TP53. Genetic testing was negative for pathogenic mutations in the 36 genes included on the CancerNext panel. These normal results were discussed with Ms. Casanova by telephone on 6/22/22.    FAMILY HISTORY (see attached pedigree):    Mat. Grandfather: Colon cancer, 67  Mother:  Eight colon polyps removed at age 50  Mat. Uncle:  Twelve colon polyps removed at age 41    We do not have medical records confirming the diagnoses in Ms. Casanova’s family.    RISK ASSESSMENT: Ms. Casanova’s personal history of cancer led to concern regarding a hereditary cancer syndrome. Ms. Casanova clearly meets NCCN guidelines criteria for Gibson syndrome testing based on her personal history of rectal cancer diagnosed before age 52. Ms. Casanova opted to pursue multigene panel testing via the CancerNext  panel. These risk assessments are based on the family history information provided at the time of the appointment and could change in the future should new information be obtained.    GENETIC COUNSELING:  We reviewed the family history information in detail. Cases of cancer follow three general patterns: sporadic, familial, and hereditary.  While most cancer is sporadic, some cases appear to occur in family clusters.  These cases are said to be familial and account for 10-20% of cancer cases.  Familial cases may be due to a combination of shared genes and environmental factors among family members.  In even fewer families, the cancer is said to be inherited, and the genes responsible for the cancer are known.      Family histories typical of hereditary cancer syndromes usually include multiple first- and second-degree relatives diagnosed with cancer types that define a syndrome. These cases tend to be diagnosed at younger-than-expected ages and can be bilateral or multifocal. The cancer in these families follows an autosomal dominant inheritance pattern, which indicates the likely presence of a mutation in a cancer susceptibility gene. Children and siblings of an individual believed to carry this mutation have a 50% chance of inheriting that mutation, thereby inheriting the increased risk to develop cancer. These mutations can be passed down from the maternal or the paternal lineage.    The most common form of hereditary colorectal cancer is Gibson syndrome.  Gibson syndrome is caused by mutations in mismatch repair genes, (MLH1, MSH2, MSH6, and PMS2).  The lifetime risk for colon cancer for individuals with Gibson syndrome is approximately 80% if there is no intervention (i.e. removal of polyps detected on colonoscopy).  Other risks include gastric, urinary tract, small intestines, biliary tract, pancreas, and brain.  Women with Gibson syndrome also have an elevated risk for ovarian and endometrial cancer. We also  reviewed hereditary polyposis syndromes.       We discussed that there are other hereditary cancer syndromes. Some of these conditions have well defined cancer risks and established management guidelines.  Other genes that can be tested for have been more recently described, and there may be less data regarding the risks and therefore may not have established management guidelines.  We discussed these limitations at length. Based on Ms. Casanova’s personal and family history of cancer and her desire to get more information regarding her personal risks and risks to family members she opted to pursue testing through a panel evaluating several other genes known to increase the risk for cancer.    GENETIC TESTING:  The risks, benefits and limitations of genetic testing and implications for clinical management following testing were reviewed. DNA test results can influence decisions regarding screening and prevention.  Genetic testing can have significant psychological implications for both individuals and families. Also discussed was the possibility of employment and insurance discrimination based on genetic test results and the federal and states laws that are in place to prevent this (CHERIE), as well as the limitations of these laws.         We discussed panel testing, which would involve testing 36 genes associated with increased cancer risk. The benefits and limitations of genetic testing were discussed. The implications of a positive or negative test result were discussed. We discussed the possibility that, in some cases, genetic test results may be ambiguous due to the identification of a genetic variant. These variants may or may not be associated with an increased cancer risk. With multigene panel testing, it is not uncommon for a variant of uncertain significance (VUS) to be identified.  If a VUS is identified, testing family members is not recommended and screening recommendations are made based on the family  history. The laboratories that perform genetic testing work to reclassify the VUS and send out an amended report if and when a VUS is reclassified.  The majority of variant findings are ultimately reclassified to a negative result.     TEST RESULTS:  Genetic testing was negative for known pathogenic mutations by sequencing, rearrangement testing, and RNA analysis for the 36 genes included on this panel.   This negative result lowers but does not eliminate the possibility of a hereditary cancer syndrome for Ms. Casanova.     CANCER SCREENING: Ms. Casanova’s management and surveillance should be determined by her oncology team. For her family members, current NCCN guidelines recommend that individuals who have a first-degree relative diagnosed with colorectal cancer at any age should begin colonoscopy screening at age 40 or ten years before the earliest diagnosis of colorectal cancer in the family, whichever is earliest, and repeat every five years or more frequently based on personal clinical findings. This assessment is based on the information provided at the time of the consultation and could change should new information become available regarding the family history.    PLAN: Genetic counseling remains available to Ms. Casanova. She is welcome to contact our office at [phone] with any questions she may have.       Wendy Coyle, MS, JD [facility], [facility]  Licensed Certified Genetic Counselor       Cc: MD Agustin Fenton MD

## 2022-06-23 ENCOUNTER — APPOINTMENT (OUTPATIENT)
Dept: PET IMAGING | Facility: HOSPITAL | Age: 32
End: 2022-06-23

## 2022-06-23 ENCOUNTER — HOSPITAL ENCOUNTER (OUTPATIENT)
Dept: PET IMAGING | Facility: HOSPITAL | Age: 32
End: 2022-06-23

## 2022-06-27 ENCOUNTER — TELEPHONE (OUTPATIENT)
Dept: ONCOLOGY | Facility: CLINIC | Age: 32
End: 2022-06-27

## 2022-06-27 NOTE — TELEPHONE ENCOUNTER
The [facility] received a fax that requires your attention. The document has been indexed to the patient’s chart for your review.      Reason for sending: NOTIFICATION OF PATIENT SEEKING OPINION    Documents Description: NOTIFICATION     Name of Sender:DEREJE ANTHONY MD  [facility]     Date Indexed: 06-27-22     Notes (if needed): INDEXED UNDER EXT MED REC. 06-27-22.   THANK YOU

## 2022-06-30 ENCOUNTER — APPOINTMENT (OUTPATIENT)
Dept: PREADMISSION TESTING | Facility: HOSPITAL | Age: 32
End: 2022-06-30

## 2022-07-03 ENCOUNTER — APPOINTMENT (OUTPATIENT)
Dept: PREADMISSION TESTING | Facility: HOSPITAL | Age: 32
End: 2022-07-03

## 2022-07-06 ENCOUNTER — DOCUMENTATION (OUTPATIENT)
Dept: GASTROENTEROLOGY | Facility: CLINIC | Age: 32
End: 2022-07-06

## 2022-07-06 NOTE — PROGRESS NOTES
I talked to Mrs. Casanova.  Apparently they had a tumor board and discussed her case at .  She is meeting with the surgeon on Monday to discuss further the conclusions and to decide on the treatment plan.  I told her I would talk to the colorectal surgeon from  several weeks ago.  If I can answer any further questions or if she would like to talk to me at any time she should not hesitate to call.

## 2022-07-14 ENCOUNTER — DOCUMENTATION (OUTPATIENT)
Dept: GASTROENTEROLOGY | Facility: CLINIC | Age: 32
End: 2022-07-14

## 2022-07-14 ENCOUNTER — APPOINTMENT (OUTPATIENT)
Dept: PET IMAGING | Facility: HOSPITAL | Age: 32
End: 2022-07-14

## 2023-11-21 ENCOUNTER — LAB (OUTPATIENT)
Dept: LAB | Facility: HOSPITAL | Age: 33
End: 2023-11-21
Payer: COMMERCIAL

## 2023-11-21 ENCOUNTER — TRANSCRIBE ORDERS (OUTPATIENT)
Dept: LAB | Facility: HOSPITAL | Age: 33
End: 2023-11-21
Payer: COMMERCIAL

## 2023-11-21 DIAGNOSIS — E03.9 MYXEDEMA HEART DISEASE: ICD-10-CM

## 2023-11-21 DIAGNOSIS — N97.0 FEMALE INFERTILITY ASSOCIATED WITH ANOVULATION: ICD-10-CM

## 2023-11-21 DIAGNOSIS — I51.9 MYXEDEMA HEART DISEASE: Primary | ICD-10-CM

## 2023-11-21 DIAGNOSIS — I51.9 MYXEDEMA HEART DISEASE: ICD-10-CM

## 2023-11-21 DIAGNOSIS — E03.9 MYXEDEMA HEART DISEASE: Primary | ICD-10-CM

## 2023-11-21 LAB
PROLACTIN SERPL-MCNC: 6.25 NG/ML (ref 4.79–23.3)
TSH SERPL DL<=0.05 MIU/L-ACNC: 1.74 UIU/ML (ref 0.27–4.2)

## 2023-11-21 PROCEDURE — 36415 COLL VENOUS BLD VENIPUNCTURE: CPT

## 2023-11-21 PROCEDURE — 84146 ASSAY OF PROLACTIN: CPT

## 2023-11-21 PROCEDURE — 84443 ASSAY THYROID STIM HORMONE: CPT
